# Patient Record
(demographics unavailable — no encounter records)

---

## 2024-12-05 NOTE — HISTORY OF PRESENT ILLNESS
[FreeTextEntry1] : Patient had been having frequent mid abdominal crampy pain not entirely relieved with dicyclomine as needed.  Change dicyclomine to 3 times daily and she is no longer having any pain.  She is also taking amitriptyline 25 mg nightly and famotidine 40 mg nightly for chronic dyspeptic symptoms.  Feeling overall very well.

## 2024-12-05 NOTE — ASSESSMENT
[FreeTextEntry1] : Impression: Functional dyspepsia/IBS symptoms controlled on current regimen.  Plan: Continue amitriptyline and famotidine.  Can try to reduce dicyclomine to twice daily; if successful can reduce to once every morning with additional doses as needed.  May eventually be able to change famotidine to on-demand while continuing amitriptyline.